# Patient Record
Sex: FEMALE | Race: BLACK OR AFRICAN AMERICAN | Employment: FULL TIME | URBAN - METROPOLITAN AREA
[De-identification: names, ages, dates, MRNs, and addresses within clinical notes are randomized per-mention and may not be internally consistent; named-entity substitution may affect disease eponyms.]

---

## 2020-11-05 ENCOUNTER — HOSPITAL ENCOUNTER (EMERGENCY)
Age: 30
Discharge: HOME | End: 2020-11-05
Attending: EMERGENCY MEDICINE
Payer: COMMERCIAL

## 2020-11-05 ENCOUNTER — APPOINTMENT (OUTPATIENT)
Dept: GENERAL RADIOLOGY | Age: 30
End: 2020-11-05
Payer: COMMERCIAL

## 2020-11-05 VITALS
WEIGHT: 130 LBS | HEART RATE: 73 BPM | OXYGEN SATURATION: 98 % | TEMPERATURE: 98.2 F | SYSTOLIC BLOOD PRESSURE: 131 MMHG | BODY MASS INDEX: 23.04 KG/M2 | HEIGHT: 63 IN | DIASTOLIC BLOOD PRESSURE: 82 MMHG | RESPIRATION RATE: 18 BRPM

## 2020-11-05 LAB
A/G RATIO: 1.5 (ref 1.1–2.2)
ALBUMIN SERPL-MCNC: 4.7 G/DL (ref 3.4–5)
ALP BLD-CCNC: 89 U/L (ref 40–129)
ALT SERPL-CCNC: 12 U/L (ref 10–40)
ANION GAP SERPL CALCULATED.3IONS-SCNC: 13 MMOL/L (ref 3–16)
AST SERPL-CCNC: 18 U/L (ref 15–37)
BASOPHILS ABSOLUTE: 0 K/UL (ref 0–0.2)
BASOPHILS RELATIVE PERCENT: 0.4 %
BILIRUB SERPL-MCNC: 0.4 MG/DL (ref 0–1)
BUN BLDV-MCNC: 14 MG/DL (ref 7–20)
CALCIUM SERPL-MCNC: 9.8 MG/DL (ref 8.3–10.6)
CHLORIDE BLD-SCNC: 105 MMOL/L (ref 99–110)
CO2: 21 MMOL/L (ref 21–32)
CREAT SERPL-MCNC: 0.5 MG/DL (ref 0.6–1.1)
D DIMER: 203 NG/ML DDU (ref 0–229)
EOSINOPHILS ABSOLUTE: 0 K/UL (ref 0–0.6)
EOSINOPHILS RELATIVE PERCENT: 0.8 %
GFR AFRICAN AMERICAN: >60
GFR NON-AFRICAN AMERICAN: >60
GLOBULIN: 3.1 G/DL
GLUCOSE BLD-MCNC: 92 MG/DL (ref 70–99)
HCG QUALITATIVE: NEGATIVE
HCT VFR BLD CALC: 41.9 % (ref 36–48)
HEMOGLOBIN: 14.2 G/DL (ref 12–16)
LYMPHOCYTES ABSOLUTE: 2.3 K/UL (ref 1–5.1)
LYMPHOCYTES RELATIVE PERCENT: 42.7 %
MCH RBC QN AUTO: 33.5 PG (ref 26–34)
MCHC RBC AUTO-ENTMCNC: 33.9 G/DL (ref 31–36)
MCV RBC AUTO: 98.8 FL (ref 80–100)
MONOCYTES ABSOLUTE: 0.5 K/UL (ref 0–1.3)
MONOCYTES RELATIVE PERCENT: 8.4 %
NEUTROPHILS ABSOLUTE: 2.6 K/UL (ref 1.7–7.7)
NEUTROPHILS RELATIVE PERCENT: 47.7 %
PDW BLD-RTO: 13.2 % (ref 12.4–15.4)
PLATELET # BLD: 254 K/UL (ref 135–450)
PMV BLD AUTO: 8.4 FL (ref 5–10.5)
POTASSIUM REFLEX MAGNESIUM: 3.7 MMOL/L (ref 3.5–5.1)
PRO-BNP: 9 PG/ML (ref 0–124)
RBC # BLD: 4.25 M/UL (ref 4–5.2)
SODIUM BLD-SCNC: 139 MMOL/L (ref 136–145)
TOTAL PROTEIN: 7.8 G/DL (ref 6.4–8.2)
TROPONIN: <0.01 NG/ML
TROPONIN: <0.01 NG/ML
WBC # BLD: 5.4 K/UL (ref 4–11)

## 2020-11-05 PROCEDURE — 85025 COMPLETE CBC W/AUTO DIFF WBC: CPT

## 2020-11-05 PROCEDURE — 99284 EMERGENCY DEPT VISIT MOD MDM: CPT

## 2020-11-05 PROCEDURE — 85379 FIBRIN DEGRADATION QUANT: CPT

## 2020-11-05 PROCEDURE — 83880 ASSAY OF NATRIURETIC PEPTIDE: CPT

## 2020-11-05 PROCEDURE — 6360000002 HC RX W HCPCS: Performed by: PHYSICIAN ASSISTANT

## 2020-11-05 PROCEDURE — 93005 ELECTROCARDIOGRAM TRACING: CPT | Performed by: EMERGENCY MEDICINE

## 2020-11-05 PROCEDURE — 84484 ASSAY OF TROPONIN QUANT: CPT

## 2020-11-05 PROCEDURE — 71046 X-RAY EXAM CHEST 2 VIEWS: CPT

## 2020-11-05 PROCEDURE — 80053 COMPREHEN METABOLIC PANEL: CPT

## 2020-11-05 PROCEDURE — 96374 THER/PROPH/DIAG INJ IV PUSH: CPT

## 2020-11-05 PROCEDURE — 84703 CHORIONIC GONADOTROPIN ASSAY: CPT

## 2020-11-05 RX ORDER — KETOROLAC TROMETHAMINE 30 MG/ML
15 INJECTION, SOLUTION INTRAMUSCULAR; INTRAVENOUS ONCE
Status: COMPLETED | OUTPATIENT
Start: 2020-11-05 | End: 2020-11-05

## 2020-11-05 RX ORDER — NAPROXEN 500 MG/1
500 TABLET ORAL 2 TIMES DAILY PRN
Qty: 20 TABLET | Refills: 0 | Status: SHIPPED | OUTPATIENT
Start: 2020-11-05 | End: 2020-11-15

## 2020-11-05 RX ADMIN — KETOROLAC TROMETHAMINE 15 MG: 30 INJECTION, SOLUTION INTRAMUSCULAR at 18:24

## 2020-11-05 ASSESSMENT — ENCOUNTER SYMPTOMS
VOMITING: 0
CHEST TIGHTNESS: 0
ABDOMINAL PAIN: 0
SHORTNESS OF BREATH: 1
DIARRHEA: 0
NAUSEA: 0

## 2020-11-05 ASSESSMENT — HEART SCORE: ECG: 0

## 2020-11-05 ASSESSMENT — PAIN DESCRIPTION - PAIN TYPE: TYPE: ACUTE PAIN

## 2020-11-05 ASSESSMENT — PAIN DESCRIPTION - FREQUENCY: FREQUENCY: INTERMITTENT

## 2020-11-05 ASSESSMENT — PAIN DESCRIPTION - LOCATION
LOCATION: CHEST
LOCATION: CHEST

## 2020-11-05 ASSESSMENT — PAIN SCALES - GENERAL
PAINLEVEL_OUTOF10: 10
PAINLEVEL_OUTOF10: 10

## 2020-11-05 NOTE — LETTER
[facility]  [address] [facility], [address]             November 5, 2020    Patient: Favian Goldberg   YOB: 1990   Date of Visit: 11/5/2020       To Whom It May Concern:    Favian Goldberg was seen and treated in our emergency department on 11/5/2020. She may return to work on 11/6/20.       Sincerely,         Antione Oconnor PA-C

## 2020-11-05 NOTE — ED PROVIDER NOTES
I independently performed a history and physical on Hipolito Whitaker. All diagnostic, treatment, and disposition decisions were made by myself in conjunction with the advanced practice provider. I have participated in the medical decision making and directed the treatment plan and disposition of the patient. For further details of [facility] emergency department encounter, please see the advanced practice provider's documentation. CHIEF COMPLAINT  Chief Complaint   Patient presents with    Chest Pain     pt brought in by [facility] from work c.o sudden onset of intermittent CP for the past hr, denies SOB or n/v.     Briefly, Hipolito Whitaker is a 35 y.o. female  who presents to the ED complaining of chest pain today. Onset while at work. This was a new job for her today. No SOB, abd pain, n/v or fevers. She reports it is central chest pain and is mostly gone by the time I evaluate her. She had her new work call 911 while she was training. No leg swelling, h/o MI or DVT/PE. No leg swelling. FOCUSED PHYSICAL EXAMINATION  /82   Pulse 73   Temp 98.2 °F (36.8 °C) (Oral)   Resp 18   Ht 5' 3\" (1.6 m)   Wt 130 lb (59 kg)   LMP 04/02/2018   SpO2 98%   BMI 23.03 kg/m²    Focused physical examination notable for no acute distress, well-appearing, well-nourished, normal speech and mentation without obvious facial droop, no obvious rash. No obvious cranial nerve deficits on my initial exam. RRR, CTAB, abd soft NTND, no leg swelling.     The 12 lead EKG was interpreted by me as follows:  Rate: bradycardia with a rate of 55  Rhythm: sinus  Axis: normal  Intervals: normal CO, narrow QRS, normal QTc  ST segments: no ST elevations or depressions  T waves: no abnormal inversions  Non-specific T wave changes: not present  Prior EKG comparison: No prior is currently available for comparison    MDM:  Diagnostic considerations included acute coronary syndrome, pulmonary embolism, COPD/asthma, pneumonia, musculoskeletal, reflux/PUD/gastritis, pneumothorax, CHF, thoracic aortic dissection, anxiety    ED course was notable for chest pain, reassuring workup, trop neg x2, HEART score low, EKG not ischemic, low risk for PE with neg ddimer, VS reassuring. No fevers. Suspect anxiety or MSK etiologies given her new job today but no definitive diagnostics for these conditions. F/u with PCP, trial of NSAIDs, and return for new or worsening sx. New PCP referral made. HEART SCORE:    History: 1  EC  Patient Age: 1  Risk Factors: 0  Troponin: 0  Heart Score Total: 1      Heart score: 1. This falls under the following category: Score of 0-3, which indicates a very low risk for major adverse cardiac event and supports early discharge    During the patient's ED course, the patient was given:  Medications   ketorolac (TORADOL) injection 15 mg (15 mg Intravenous Given 20)        CLINICAL IMPRESSION  1. Chest pain, unspecified type    2. Sinus bradycardia by electrocardiogram        DISPOSITION  Kemar Randall was discharged to home in stable condition. I have discussed the findings of today's workup with the patient and addressed the patient's questions and concerns. Important warning signs as well as new or worsening symptoms which would necessitate immediate return to the ED were discussed. The plan is to discharge from the ED at this time, and the patient is in stable condition. The patient acknowledged understanding is agreeable with this plan. Follow-up with:  Abdirahman Vu  Emergency Department  [address] [place] [address]  [phone]    If symptoms worsen      This chart was created using Dragon dictation software. Efforts were made by me to ensure accuracy, however some errors may be present due to limitations of this technology.             Lulu Anderson MD  20       Lulu Anderson MD  20

## 2020-11-06 LAB
EKG ATRIAL RATE: 55 BPM
EKG DIAGNOSIS: NORMAL
EKG P AXIS: 35 DEGREES
EKG P-R INTERVAL: 148 MS
EKG Q-T INTERVAL: 476 MS
EKG QRS DURATION: 76 MS
EKG QTC CALCULATION (BAZETT): 455 MS
EKG R AXIS: 77 DEGREES
EKG T AXIS: 56 DEGREES
EKG VENTRICULAR RATE: 55 BPM

## 2020-11-06 PROCEDURE — 93010 ELECTROCARDIOGRAM REPORT: CPT | Performed by: INTERNAL MEDICINE

## 2020-11-06 NOTE — ED NOTES
Discharge instructions given with 1 prescription and verbal understanding.       [address] [facility], [address] [place] [address]  11/05/20 2042

## 2020-11-06 NOTE — ED PROVIDER NOTES
[address] [facility]        Pt Name: Marilin Arias  MRN: [id number]  [place] 1990  Date of evaluation: 11/5/2020  Provider: Candace Damon PA-C  PCP: Referring Not In System (Inactive)    This patient was seen and evaluated by the attending physician Dr. Jose Manuel Lara. CHIEF COMPLAINT       Chief Complaint   Patient presents with    Chest Pain     pt brought in by  EMS from work c.o sudden onset of intermittent CP for the past hr, denies SOB or n/v.       HISTORY OF PRESENT ILLNESS   (Location, Timing/Onset, Context/Setting, Quality, Duration, Modifying Factors, Severity, Associated Signs and Symptoms)  Note limiting factors. Marilin Arias is a 35 y.o. female presents the emergency department from the work environment with reports of difficulties as a pertains to chest pain. Patient states that she had a sudden onset of midsternal radiating to the left side of her chest chest pain that started within the hour prior to arrival.  She states it is with a dull constant ache with an intermittent sharp pressure sensation on the left-hand side. She states she has had difficulties with this before with chest pain and reports that she has been seen and evaluated [facility] at least on 3 different occasions for chest pain. She states she is always sent out sometimes they do something and sometimes they do not but she is never been able to determine what the cause of her chest pain is. Patient states that she is not having diaphoresis. She states that she is not having pleuritic chest pain. She is equivocal for any kind of shortness of breath and does state that it seems as if it is worse if she is breathing heavy. She denies a history of unilateral leg pain she denies a history of leg swelling. Denies a history of DVT. Denies travel history. Has no additional risk factors for thromboembolic events.   Patient is not on oral contraceptives and has had a hysterectomy even at her young age. She states she is not having fevers chills or cough. She denies abdominal or flank pain. She has no additional GI  complaints and presents to the ED for evaluation and treatment of her chest pain. Nursing Notes were all reviewed and agreed with or any disagreements were addressed in the HPI. REVIEW OF SYSTEMS    (2-9 systems for level 4, 10 or more for level 5)     Review of Systems   Constitutional: Negative for activity change, chills and fever. Respiratory: Positive for shortness of breath. Negative for chest tightness. Cardiovascular: Positive for chest pain. Negative for palpitations and leg swelling. Gastrointestinal: Negative for abdominal pain, diarrhea, nausea and vomiting. Genitourinary: Negative for dysuria and flank pain. Positives and Pertinent negatives as per HPI. Except as noted above in the ROS, all other systems were reviewed and negative. PAST MEDICAL HISTORY     Past Medical History:   Diagnosis Date    Asthma          SURGICAL HISTORY     Past Surgical History:   Procedure Laterality Date    HYSTERECTOMY           CURRENTMEDICATIONS       Previous Medications    No medications on file         ALLERGIES     Pcn [penicillins]    FAMILYHISTORY     History reviewed. No pertinent family history. SOCIAL HISTORY       Social History     Tobacco Use    Smoking status: Current Some Day Smoker     Types: Cigarettes    Smokeless tobacco: Never Used   Substance Use Topics    Alcohol use: No    Drug use: Yes     Types: Marijuana     Comment: occasionally       SCREENINGS      Heart Score for chest pain patients  History:  Moderately Suspicious  ECG: Normal  Patient Age: < 45 years  Risk Factors: No risk factors known  Troponin: < 1X normal limit  Heart Score Total: 1      PHYSICAL EXAM    (up to 7 for level 4, 8 or more for level 5)     ED Triage Vitals [11/05/20 1607]   BP Temp Temp Source Pulse Resp HCG, SERUM, QUALITATIVE    Narrative:     Performed at:  [facility] [address] [facility]  [place], [facility] [address]   Phone [phone]   CBC WITH AUTO DIFFERENTIAL    Narrative:     Performed at:  [facility] [address] [place],  [place], [address]   Phone [phone]   TROPONIN    Narrative:     Performed at:  [facility] [address]  [place], [address]   Phone [phone]   BRAIN NATRIURETIC PEPTIDE    Narrative:     Performed at:  [facility] [address] [facility],  [place], [address]   Phone [phone]   D-DIMER, QUANTITATIVE    Narrative:     Performed at:  [facility] [address] [facility]  [place], [address]   Phone [phone]   TROPONIN    Narrative:     Performed at:  [facility] [address] [facility]  [place], [facility] [address] [employer]   Phone [phone]       All other labs were within normal range or not returned as of this dictation. EKG: All EKG's are interpreted by the Emergency Department Physician in the absence of a cardiologist.  Please see their note for interpretation of EKG. RADIOLOGY:   Non-plain film images such as CT, Ultrasound and MRI are read by the radiologist. Plain radiographic images are visualized and preliminarily interpreted by the ED Provider with the below findings:        Interpretation per the Radiologist below, if available at the time of this note:    XR CHEST (2 VW)   Final Result   Negative chest x-ray. No evidence of acute cardiopulmonary disease. Xr Chest (2 Vw)    Result Date: 11/5/2020  EXAMINATION: TWO XRAY VIEWS OF THE CHEST 11/5/2020 4:33 pm COMPARISON: None.  HISTORY: ORDERING SYSTEM PROVIDED HISTORY: CP TECHNOLOGIST PROVIDED HISTORY: Reason for exam:->CP Reason for Exam: cp Acuity: Unknown Type of Exam: Unknown FINDINGS: The cardiomediastinal contours are within normal limits. The lungs appear clear bilaterally. There is no evidence of focal consolidation, pulmonary edema, pleural effusion or pneumothorax. Negative chest x-ray. No evidence of acute cardiopulmonary disease. PROCEDURES   Unless otherwise noted below, none     Procedures    CRITICAL CARE TIME   N/A    CONSULTS:  None      EMERGENCY DEPARTMENT COURSE and DIFFERENTIAL DIAGNOSIS/MDM:   Vitals:    Vitals:    11/05/20 1607 11/05/20 1846   BP: (!) 159/98 116/61   Pulse: 58 55   Resp: 16 16   Temp: 98.2 °F (36.8 °C)    TempSrc: Oral    SpO2: 100% 100%   Weight: 130 lb (59 kg)    Height: 5' 3\" (1.6 m)        Patient was given the following medications:  Medications   ketorolac (TORADOL) injection 15 mg (15 mg Intravenous Given 11/5/20 1824)         The patient's detailed history of present illness is documented as above. Upon arrival to the emergency department the patient's vital signs are as documented. The patient is noted to be hemodynamically stable and afebrile. Physical examination findings are as above. Patient was initially triaged per nursing staff. Protocol orders have been placed by nursing staff. I had the opportunity to see and assume the care of the patient as documented on the emergency department timeline. Additional laboratory testing, imaging, medications and or additional orders are as documented on the chart. Initial EKG demonstrates a sinus bradycardia with a rate of 55. She has a [place] interval 148 QRS is 76. QT interval 476. No evidence of acute ST elevation. Please see attending physician details for further EKG interpretation and potential comparison. IV access was obtained. Laboratory testing and work-up was initiated and the patient was treated as above. CBC demonstrates no evidence of leukocytosis anemia and/or thrombocytopenia. BUN is 14 creatinine is 0.5 nonfasting glucose 92 electrolytes and LFTs unremarkable. proBNP is 9.   Initial troponin is less than 0.01. D-dimer is negative predictive value of 203. Delta troponin completed at 3 hours is demonstrating no significant elevation continues at less than 0.01. Chest x-ray demonstrates no evidence of acute cardiopulmonary process. Patient's heart score is as documented above and reassuring. I believe she can be managed on an outpatient basis for her chest pain. The patient has been made aware of the signs and symptoms which would necessitate an immediate return to the emergency department and verbalizes an understanding of these signs and symptoms. The patients clinical picture is most consistent with a non-cardiac etiology. Multiple potential etiologies were considered. No clinical evidence at this time to suggest acute coronary syndrome, pulmonary embolism, aortic dissection, aortic aneurysm, myocarditis, pneumonia, pneumothorax or pericardial tamponade or pericarditis. I feel the patient can be safely discharged to home with outpatient follow up. I discussed this plan with the patient, who verbalized understanding and agreement with the plan. Instructions have been given for the patient to return to the ED for any worsening of the symptoms, including but not limited to increased pain, shortness of breath, abdominal pain or weakness. Patient seemed to understand the need for close follow-up and agreed to comply. FINAL IMPRESSION      1. Chest pain, unspecified type    2.  Sinus bradycardia by electrocardiogram          DISPOSITION/PLAN   DISPOSITION: Discharged to home      PATIENT REFERREDTO:  [facility]  [facility]  Emergency Department  [address] [place] [address]  [phone]    If symptoms worsen      DISCHARGE MEDICATIONS:  New Prescriptions    NAPROXEN (NAPROSYN) 500 MG TABLET    Take 1 tablet by mouth 2 times daily as needed for Pain       DISCONTINUED MEDICATIONS:  Discontinued Medications    ALBUTEROL (ACCUNEB) 0.63 MG/3ML NEBULIZER SOLUTION Take 1 ampule by nebulization every 6 hours as needed for Wheezing    NAPROXEN (NAPROSYN) 500 MG TABLET    Take 1 tablet by mouth 2 times daily (with meals)              (Please note that portions of this note were completed with a voice recognition program.  Efforts were made to edit the dictations but occasionally words are mis-transcribed.)    Nyla Jones PA-C (electronically signed)           Shahriar Jimenez PA-C  11/05/20 2024